# Patient Record
(demographics unavailable — no encounter records)

---

## 2025-04-14 NOTE — PHYSICAL EXAM
<-- Click to add NO significant Past Surgical History [No Acute Distress] : no acute distress [Well Nourished] : well nourished [Well Developed] : well developed [Well-Appearing] : well-appearing [Normal Sclera/Conjunctiva] : normal sclera/conjunctiva [PERRL] : pupils equal round and reactive to light [EOMI] : extraocular movements intact [Normal Outer Ear/Nose] : the outer ears and nose were normal in appearance [Normal Oropharynx] : the oropharynx was normal [No JVD] : no jugular venous distention [No Lymphadenopathy] : no lymphadenopathy [Supple] : supple [Thyroid Normal, No Nodules] : the thyroid was normal and there were no nodules present [No Respiratory Distress] : no respiratory distress  [No Accessory Muscle Use] : no accessory muscle use [Clear to Auscultation] : lungs were clear to auscultation bilaterally [Normal Rate] : normal rate  [Regular Rhythm] : with a regular rhythm [Normal S1, S2] : normal S1 and S2 [No Murmur] : no murmur heard [No Carotid Bruits] : no carotid bruits [No Abdominal Bruit] : a ~M bruit was not heard ~T in the abdomen [No Varicosities] : no varicosities [Pedal Pulses Present] : the pedal pulses are present [No Edema] : there was no peripheral edema [No Palpable Aorta] : no palpable aorta [No Extremity Clubbing/Cyanosis] : no extremity clubbing/cyanosis [Soft] : abdomen soft [Non Tender] : non-tender [Non-distended] : non-distended [No Masses] : no abdominal mass palpated [No HSM] : no HSM [Normal Bowel Sounds] : normal bowel sounds [Normal Posterior Cervical Nodes] : no posterior cervical lymphadenopathy [Normal Anterior Cervical Nodes] : no anterior cervical lymphadenopathy [No CVA Tenderness] : no CVA  tenderness [No Spinal Tenderness] : no spinal tenderness [No Joint Swelling] : no joint swelling [Grossly Normal Strength/Tone] : grossly normal strength/tone [No Rash] : no rash [Coordination Grossly Intact] : coordination grossly intact [No Focal Deficits] : no focal deficits [Normal Gait] : normal gait [Deep Tendon Reflexes (DTR)] : deep tendon reflexes were 2+ and symmetric [Normal Affect] : the affect was normal [Normal Insight/Judgement] : insight and judgment were intact [de-identified] : mild obesity

## 2025-04-14 NOTE — ASSESSMENT
[FreeTextEntry1] : #Class 1 obesity #HLD Recommend a healthy diet that emphasizes plenty of fruits and vegetables, especially deeply colored fruits/vegetables (such as leafy greens, peaches) that are more nutrient-dense. Continue to work hard on diet and exercise, limiting/avoiding saturated fat, fatty foods, greasy foods, red meats, white flour-based carbohydrates (cookies, cakes, white bread, white rice), and added sugars. Choose whole grain foods and products made with whole grains over refined grains and white flour-based carbohydrates. Avoid beverages and food with added sugar. Limit salt intake to improve blood pressure. Limit alcohol intake. Should also engage in at least 150 minutes of accumulated moderate-intensity physical activity per week or 75 minutes of vigorous-intensity physical activity per week. Currently there are no contraindications for the use of Zepbound after reviewing the pt's medical history and labs, including personal and family history of thyroid cancer or MEN 2 Syndrome. Possible side effects of Zepbound were discussed with the patient, including nausea, abdominal pain, reflux, constipation, delayed gastric emptying, gallbladder infection, kidney stones, visual changes, and pancreatitis. Pt verbalizes understanding and wishes to proceed with medical therapy. Pt understands the need for long-term use and understands the risk of weight-gain upon stopping weight loss medications. Will work on PA. repeat fasting lipid panel EKG obtained to assist in diagnosis and management of assessed problem(s). NSR rate 67 no ST abnormalities.  #Back pain needs PT doing in [place] f/u with Dr. Carlin  #Anxiety ISTOP reviewed and confirmed by me; no evidence of abuse/diversion. Renewed Xanax.  #HCM labs drawn UTD flu shot 1st shingrix given rtc for 2nd in 2-6 months discussed ekg  [Vaccines Reviewed] : Immunizations reviewed today. Please see immunization details in the vaccine log within the immunization flowsheet.

## 2025-04-14 NOTE — REVIEW OF SYSTEMS
[Fever] : no fever [Chills] : no chills [Fatigue] : no fatigue [Hot Flashes] : no hot flashes [Night Sweats] : no night sweats [Recent Change In Weight] : ~T recent weight change [Joint Pain] : no joint pain [Joint Stiffness] : no joint stiffness [Muscle Pain] : no muscle pain [Muscle Weakness] : no muscle weakness [Back Pain] : back pain [Joint Swelling] : no joint swelling [Negative] : Heme/Lymph

## 2025-04-14 NOTE — HISTORY OF PRESENT ILLNESS
[FreeTextEntry1] : physical [de-identified] : Pt comes in for adult annual wellness visit and labs and ekg and exam and med renewal. Lives in [place] and [place] not seen in a year. Seeing Dr. Carlin in [place] for his back pain, doing PT. Concerned about weight gain and not being able to exercise due to pain. Ideal weight 180-185lbs, up to 218lbs. No other complaints at this time.  No fevers/chills/CP/SOB/palpitations/leg swelling/abdominal pain/nausea/vomiting/diarrhea/recent illnesses/sick contacts.

## 2025-04-14 NOTE — COUNSELING
[Potential consequences of obesity discussed] : Potential consequences of obesity discussed [Benefits of weight loss discussed] : Benefits of weight loss discussed [Encouraged to maintain food diary] : Encouraged to maintain food diary [Encouraged to increase physical activity] : Encouraged to increase physical activity [FreeTextEntry2] : see below [FreeTextEntry4] : 15 [Good understanding] : Patient has a good understanding of lifestyle changes and steps needed to achieve self management goal

## 2025-04-14 NOTE — HEALTH RISK ASSESSMENT
[Never (0 pts)] : Never (0 points) [No] : In the past 12 months have you used drugs other than those required for medical reasons? No [No falls in past year] : Patient reported no falls in the past year [Little interest or pleasure doing things] : 1) Little interest or pleasure doing things [Feeling down, depressed, or hopeless] : 2) Feeling down, depressed, or hopeless [0] : 2) Feeling down, depressed, or hopeless: Not at all (0) [PHQ-2 Negative - No further assessment needed] : PHQ-2 Negative - No further assessment needed [de-identified] : none [Audit-CScore] : 0 [de-identified] : "fair" [[id number]] : 0 [Never] : Never [NO] : No [Patient declined colonoscopy] : Patient declined colonoscopy [HIV test declined] : HIV test declined [Hepatitis C test declined] : Hepatitis C test declined [Change in mental status noted] : No change in mental status noted [Language] : denies difficulty with language [Behavior] : denies difficulty with behavior [Learning/Retaining New Information] : denies difficulty learning/retaining new information [Handling Complex Tasks] : denies difficulty handling complex tasks [Reasoning] : denies difficulty with reasoning [Spatial Ability and Orientation] : denies difficulty with spatial ability and orientation [With Significant Other] : lives with significant other [# of Members in Household ___] :  household currently consist of [unfilled] member(s) [On disability] : on disability [] :  [Sexually Active] : sexually active [High Risk Behavior] : no high risk behavior [Feels Safe at Home] : Feels safe at home [Fully functional (bathing, dressing, toileting, transferring, walking, feeding)] : Fully functional (bathing, dressing, toileting, transferring, walking, feeding) [Fully functional (using the telephone, shopping, preparing meals, housekeeping, doing laundry, using] : Fully functional and needs no help or supervision to perform IADLs (using the telephone, shopping, preparing meals, housekeeping, doing laundry, using transportation, managing medications and managing finances) [Reports changes in hearing] : Reports no changes in hearing [Reports changes in vision] : Reports no changes in vision [Reports normal functional visual acuity (ie: able to read med bottle)] : Reports normal functional visual acuity